# Patient Record
Sex: MALE | Race: ASIAN | URBAN - METROPOLITAN AREA
[De-identification: names, ages, dates, MRNs, and addresses within clinical notes are randomized per-mention and may not be internally consistent; named-entity substitution may affect disease eponyms.]

---

## 2017-05-16 ENCOUNTER — OUTPATIENT (OUTPATIENT)
Dept: OUTPATIENT SERVICES | Facility: HOSPITAL | Age: 32
LOS: 1 days | Discharge: HOME | End: 2017-05-16

## 2017-05-17 DIAGNOSIS — F16.20 HALLUCINOGEN DEPENDENCE, UNCOMPLICATED: ICD-10-CM

## 2017-08-25 PROBLEM — Z00.00 ENCOUNTER FOR PREVENTIVE HEALTH EXAMINATION: Status: ACTIVE | Noted: 2017-08-25

## 2017-09-02 ENCOUNTER — APPOINTMENT (OUTPATIENT)
Dept: MRI IMAGING | Facility: IMAGING CENTER | Age: 32
End: 2017-09-02
Payer: COMMERCIAL

## 2017-09-02 ENCOUNTER — OUTPATIENT (OUTPATIENT)
Dept: OUTPATIENT SERVICES | Facility: HOSPITAL | Age: 32
LOS: 1 days | End: 2017-09-02
Payer: COMMERCIAL

## 2017-09-02 DIAGNOSIS — Z00.8 ENCOUNTER FOR OTHER GENERAL EXAMINATION: ICD-10-CM

## 2017-09-02 PROCEDURE — 72148 MRI LUMBAR SPINE W/O DYE: CPT

## 2017-09-02 PROCEDURE — 72148 MRI LUMBAR SPINE W/O DYE: CPT | Mod: 26

## 2019-07-16 ENCOUNTER — EMERGENCY (EMERGENCY)
Facility: HOSPITAL | Age: 34
LOS: 1 days | Discharge: HOME | End: 2019-07-16
Attending: EMERGENCY MEDICINE
Payer: COMMERCIAL

## 2019-07-16 VITALS
RESPIRATION RATE: 20 BRPM | HEIGHT: 70 IN | TEMPERATURE: 97 F | HEART RATE: 59 BPM | SYSTOLIC BLOOD PRESSURE: 150 MMHG | OXYGEN SATURATION: 100 % | WEIGHT: 190.04 LBS | DIASTOLIC BLOOD PRESSURE: 97 MMHG

## 2019-07-16 VITALS
OXYGEN SATURATION: 100 % | TEMPERATURE: 97 F | HEART RATE: 63 BPM | DIASTOLIC BLOOD PRESSURE: 84 MMHG | SYSTOLIC BLOOD PRESSURE: 130 MMHG | RESPIRATION RATE: 18 BRPM

## 2019-07-16 LAB
ALBUMIN SERPL ELPH-MCNC: 4.1 G/DL — SIGNIFICANT CHANGE UP (ref 3.5–5)
ALP SERPL-CCNC: 66 U/L — SIGNIFICANT CHANGE UP (ref 40–120)
ALT FLD-CCNC: 46 U/L DA — SIGNIFICANT CHANGE UP (ref 10–60)
ANION GAP SERPL CALC-SCNC: 9 MMOL/L — SIGNIFICANT CHANGE UP (ref 5–17)
APPEARANCE UR: CLEAR — SIGNIFICANT CHANGE UP
AST SERPL-CCNC: 28 U/L — SIGNIFICANT CHANGE UP (ref 10–40)
BACTERIA # UR AUTO: ABNORMAL /HPF
BILIRUB SERPL-MCNC: 0.8 MG/DL — SIGNIFICANT CHANGE UP (ref 0.2–1.2)
BILIRUB UR-MCNC: NEGATIVE — SIGNIFICANT CHANGE UP
BUN SERPL-MCNC: 17 MG/DL — SIGNIFICANT CHANGE UP (ref 7–18)
CALCIUM SERPL-MCNC: 8.9 MG/DL — SIGNIFICANT CHANGE UP (ref 8.4–10.5)
CHLORIDE SERPL-SCNC: 108 MMOL/L — SIGNIFICANT CHANGE UP (ref 96–108)
CO2 SERPL-SCNC: 24 MMOL/L — SIGNIFICANT CHANGE UP (ref 22–31)
COLOR SPEC: YELLOW — SIGNIFICANT CHANGE UP
COMMENT - URINE: SIGNIFICANT CHANGE UP
CREAT SERPL-MCNC: 1.13 MG/DL — SIGNIFICANT CHANGE UP (ref 0.5–1.3)
DIFF PNL FLD: ABNORMAL
EPI CELLS # UR: ABNORMAL /HPF
GLUCOSE SERPL-MCNC: 136 MG/DL — HIGH (ref 70–99)
GLUCOSE UR QL: NEGATIVE — SIGNIFICANT CHANGE UP
HCT VFR BLD CALC: 45.2 % — SIGNIFICANT CHANGE UP (ref 39–50)
HGB BLD-MCNC: 13.7 G/DL — SIGNIFICANT CHANGE UP (ref 13–17)
KETONES UR-MCNC: NEGATIVE — SIGNIFICANT CHANGE UP
LEUKOCYTE ESTERASE UR-ACNC: NEGATIVE — SIGNIFICANT CHANGE UP
MCHC RBC-ENTMCNC: 19.3 PG — LOW (ref 27–34)
MCHC RBC-ENTMCNC: 30.3 GM/DL — LOW (ref 32–36)
MCV RBC AUTO: 63.8 FL — LOW (ref 80–100)
NITRITE UR-MCNC: NEGATIVE — SIGNIFICANT CHANGE UP
NRBC # BLD: 0 /100 WBCS — SIGNIFICANT CHANGE UP (ref 0–0)
PH UR: 9 — HIGH (ref 5–8)
PLATELET # BLD AUTO: 193 K/UL — SIGNIFICANT CHANGE UP (ref 150–400)
POTASSIUM SERPL-MCNC: 4 MMOL/L — SIGNIFICANT CHANGE UP (ref 3.5–5.3)
POTASSIUM SERPL-SCNC: 4 MMOL/L — SIGNIFICANT CHANGE UP (ref 3.5–5.3)
PROT SERPL-MCNC: 7.9 G/DL — SIGNIFICANT CHANGE UP (ref 6–8.3)
PROT UR-MCNC: 15
RBC # BLD: 7.09 M/UL — HIGH (ref 4.2–5.8)
RBC # FLD: 18.6 % — HIGH (ref 10.3–14.5)
RBC CASTS # UR COMP ASSIST: ABNORMAL /HPF (ref 0–2)
SODIUM SERPL-SCNC: 141 MMOL/L — SIGNIFICANT CHANGE UP (ref 135–145)
SP GR SPEC: 1.01 — SIGNIFICANT CHANGE UP (ref 1.01–1.02)
UROBILINOGEN FLD QL: NEGATIVE — SIGNIFICANT CHANGE UP
WBC # BLD: 12.48 K/UL — HIGH (ref 3.8–10.5)
WBC # FLD AUTO: 12.48 K/UL — HIGH (ref 3.8–10.5)
WBC UR QL: SIGNIFICANT CHANGE UP /HPF (ref 0–5)

## 2019-07-16 PROCEDURE — 80053 COMPREHEN METABOLIC PANEL: CPT

## 2019-07-16 PROCEDURE — 36415 COLL VENOUS BLD VENIPUNCTURE: CPT

## 2019-07-16 PROCEDURE — 99285 EMERGENCY DEPT VISIT HI MDM: CPT

## 2019-07-16 PROCEDURE — 74176 CT ABD & PELVIS W/O CONTRAST: CPT | Mod: 26

## 2019-07-16 PROCEDURE — 85027 COMPLETE CBC AUTOMATED: CPT

## 2019-07-16 PROCEDURE — 81001 URINALYSIS AUTO W/SCOPE: CPT

## 2019-07-16 PROCEDURE — 99284 EMERGENCY DEPT VISIT MOD MDM: CPT | Mod: 25

## 2019-07-16 PROCEDURE — 96375 TX/PRO/DX INJ NEW DRUG ADDON: CPT

## 2019-07-16 PROCEDURE — 74176 CT ABD & PELVIS W/O CONTRAST: CPT

## 2019-07-16 PROCEDURE — 96374 THER/PROPH/DIAG INJ IV PUSH: CPT

## 2019-07-16 RX ORDER — SODIUM CHLORIDE 9 MG/ML
3 INJECTION INTRAMUSCULAR; INTRAVENOUS; SUBCUTANEOUS ONCE
Refills: 0 | Status: COMPLETED | OUTPATIENT
Start: 2019-07-16 | End: 2019-07-16

## 2019-07-16 RX ORDER — ONDANSETRON 8 MG/1
4 TABLET, FILM COATED ORAL ONCE
Refills: 0 | Status: COMPLETED | OUTPATIENT
Start: 2019-07-16 | End: 2019-07-16

## 2019-07-16 RX ORDER — MORPHINE SULFATE 50 MG/1
4 CAPSULE, EXTENDED RELEASE ORAL ONCE
Refills: 0 | Status: DISCONTINUED | OUTPATIENT
Start: 2019-07-16 | End: 2019-07-16

## 2019-07-16 RX ORDER — KETOROLAC TROMETHAMINE 30 MG/ML
30 SYRINGE (ML) INJECTION ONCE
Refills: 0 | Status: DISCONTINUED | OUTPATIENT
Start: 2019-07-16 | End: 2019-07-16

## 2019-07-16 RX ADMIN — Medication 30 MILLIGRAM(S): at 14:19

## 2019-07-16 RX ADMIN — SODIUM CHLORIDE 3 MILLILITER(S): 9 INJECTION INTRAMUSCULAR; INTRAVENOUS; SUBCUTANEOUS at 14:19

## 2019-07-16 RX ADMIN — ONDANSETRON 4 MILLIGRAM(S): 8 TABLET, FILM COATED ORAL at 14:19

## 2019-07-16 RX ADMIN — MORPHINE SULFATE 4 MILLIGRAM(S): 50 CAPSULE, EXTENDED RELEASE ORAL at 14:19

## 2019-07-16 NOTE — ED PROVIDER NOTE - NEUROLOGICAL, MLM
Alert and oriented, no focal deficits, no motor or sensory deficits. No clubbing, cyanosis or edema of the lower extremities.

## 2019-07-16 NOTE — ED PROVIDER NOTE - OBJECTIVE STATEMENT
34 y/o pt with no significant PMHx and no significant PSHx c/o severe LLQ pain x 2 days. Pt relates associated sxs of urinary frequency and an episode of gross hematuria 2 days ago. Pt does not have a h/o smoking, but occasionally drinks. Pt denies n/v, h/o kidney stones or any other acute complaints. NKDA.

## 2022-01-17 ENCOUNTER — TRANSCRIPTION ENCOUNTER (OUTPATIENT)
Age: 37
End: 2022-01-17

## 2024-07-11 NOTE — ED ADULT NURSE NOTE - PAIN: PRESENCE, MLM
[facility] Neurology Daily Progress Note  Name: Mateo Marsh  Age: 57 y.o.  Gender: male  CodeStatus: Full Code  Allergies: No Known Allergies    Chief Complaint:Sickle Cell Pain Crisis (Patient states pain all over from sickle cell .)    Primary Care Provider: Rey Pang MD  InpatientTreatment Team: Treatment Team: Attending Provider: Camilo Michael MD; Consulting Physician: Satya Sanchez MD; Consulting Physician: Hussein Strong MD; Consulting Physician: Denver Montes MD; Consulting Physician: Miles Baxter MD; Physician Assistant: Rj Michelle PA; Consulting Physician: Edgar Olson MD; Consulting Physician: Ruslan Almodovar MD; Registered Nurse: Zeny James RN; : Joselyn Johns RN; Utilization Reviewer: Shandra Leal, RN; Registered Nurse: Hortensia Conrad RN  Admission Date: 7/9/2024      HPI   Pt seen and examined for neurology follow-up.  Patient is lethargic.  Will briefly awaken to tactile stimuli but falls back asleep.  Confused.  No seizure activity reported.  Moves all extremities spontaneously.  Temp 99.3.  Hypertensive this morning at 191/94.    Very complicated issue and complicated case.  Of note patient has significant pain on urinating unclear if this is neuropathic.  Vitals:    07/11/24 1137   BP: (!) 191/94   Pulse: (!) 112   Resp: 16   Temp: 99.3 °F (37.4 °C)   SpO2: 93%        Review of Systems   Unable to perform ROS: Mental status change   Constitutional:  Negative for fever.   HENT:  Negative for hearing loss.    Respiratory:  Negative for cough and wheezing.    Cardiovascular:  Negative for leg swelling.   Gastrointestinal:  Negative for vomiting.   Skin:  Negative for color change and rash.   Neurological:  Positive for speech difficulty and weakness (eneralized). Negative for tremors, seizures, syncope and facial asymmetry.   Psychiatric/Behavioral:  Positive for confusion. Negative for agitation and hallucinations. The patient is not  nervous/anxious.          Physical Exam  Vitals and nursing note reviewed.   Constitutional:       General: He is not in acute distress.     Appearance: He is ill-appearing. He is not diaphoretic.   HENT:      Head: Normocephalic and atraumatic.   Eyes:      Pupils: Pupils are equal, round, and reactive to light.   Cardiovascular:      Rate and Rhythm: Normal rate and regular rhythm.   Pulmonary:      Effort: Pulmonary effort is normal. No respiratory distress.      Breath sounds: Normal breath sounds.   Skin:     General: Skin is warm and dry.   Neurological:      Mental Status: He is lethargic and disoriented.      Motor: No tremor or seizure activity.      Comments: Patient remains lethargic.  Neuroexam limited.  Mostly nonverbal and unable to follow commands.     Exam as noted above.          Medications:  Reviewed    Infusion Medications:    sodium chloride      sodium chloride 150 mL/hr at 07/11/24 1034    sodium chloride      dextrose       Scheduled Medications:    [Held by provider] piperacillin-tazobactam  3,375 mg IntraVENous Q8H    sodium chloride flush  5-40 mL IntraVENous 2 times per day    sodium chloride flush  5-40 mL IntraVENous 2 times per day    [Held by provider] enoxaparin  40 mg SubCUTAneous Daily    aspirin  81 mg Oral Daily    azithromycin  500 mg IntraVENous Q24H    insulin lispro  0-8 Units SubCUTAneous TID WC    insulin lispro  0-4 Units SubCUTAneous Nightly    carvedilol  12.5 mg Oral BID    tamsulosin  0.4 mg Oral Daily     PRN Meds: sodium chloride flush, sodium chloride, ondansetron, nitroGLYCERIN, morphine, HYDROmorphone, hydrALAZINE, sodium chloride flush, sodium chloride, potassium chloride **OR** potassium alternative oral replacement **OR** potassium chloride, magnesium sulfate, melatonin, polyethylene glycol, acetaminophen **OR** acetaminophen, glucose, dextrose bolus **OR** dextrose bolus, glucagon (rDNA), dextrose, LORazepam    Labs:   Recent Labs     07/09/24  2030  complains of pain/discomfort